# Patient Record
Sex: FEMALE | Race: WHITE | NOT HISPANIC OR LATINO | Employment: FULL TIME | URBAN - METROPOLITAN AREA
[De-identification: names, ages, dates, MRNs, and addresses within clinical notes are randomized per-mention and may not be internally consistent; named-entity substitution may affect disease eponyms.]

---

## 2019-12-06 ENCOUNTER — HOSPITAL ENCOUNTER (OUTPATIENT)
Dept: URGENT CARE | Facility: CLINIC | Age: 34
Discharge: HOME | End: 2019-12-06

## 2020-07-30 ENCOUNTER — HOSPITAL ENCOUNTER (OUTPATIENT)
Dept: URGENT CARE | Facility: CLINIC | Age: 35
Discharge: HOME | End: 2020-07-30
Attending: FAMILY MEDICINE

## 2020-08-10 ENCOUNTER — OFFICE VISIT CONVERTED (OUTPATIENT)
Dept: FAMILY MEDICINE CLINIC | Facility: CLINIC | Age: 35
End: 2020-08-10
Attending: NURSE PRACTITIONER

## 2020-08-28 ENCOUNTER — OFFICE VISIT CONVERTED (OUTPATIENT)
Dept: FAMILY MEDICINE CLINIC | Facility: CLINIC | Age: 35
End: 2020-08-28
Attending: NURSE PRACTITIONER

## 2020-08-28 ENCOUNTER — HOSPITAL ENCOUNTER (OUTPATIENT)
Dept: FAMILY MEDICINE CLINIC | Facility: CLINIC | Age: 35
Discharge: HOME | End: 2020-08-28
Attending: NURSE PRACTITIONER

## 2020-09-03 LAB
CONV LAST MENSTURAL PERIOD: NORMAL
HPV HYBRID CAPTURE HIGH RISK: NEGATIVE
SPECIMEN SOURCE: NORMAL
SPECIMEN SOURCE: NORMAL
THIN PREP CVX: NORMAL

## 2020-10-02 ENCOUNTER — OFFICE VISIT CONVERTED (OUTPATIENT)
Dept: FAMILY MEDICINE CLINIC | Facility: CLINIC | Age: 35
End: 2020-10-02
Attending: NURSE PRACTITIONER

## 2020-10-07 ENCOUNTER — OFFICE VISIT CONVERTED (OUTPATIENT)
Dept: GASTROENTEROLOGY | Facility: CLINIC | Age: 35
End: 2020-10-07
Attending: NURSE PRACTITIONER

## 2020-10-16 ENCOUNTER — TELEMEDICINE CONVERTED (OUTPATIENT)
Dept: FAMILY MEDICINE CLINIC | Facility: CLINIC | Age: 35
End: 2020-10-16
Attending: NURSE PRACTITIONER

## 2021-05-10 NOTE — H&P
"   History and Physical      Patient Name: Jessica Nuñez   Patient ID: [id number]   Sex: Female   YOB: 1985    Primary Care Provider: Stephany SKY   Referring Provider: Stephany SKY    Visit Date: October 7, 2020    Provider: ASYA Metzger   Location: [facility]   Location Address: [address] [place] [address], [facility] [address]  [place], [place]  [id number]   Location Phone: [phone]          Chief Complaint  · ER Follow Up      History Of Present Illness  The patient is a 35 year old /White female who presents on referral from Stephany SKY for a gastroenterology evaluation.      Patient recently visited [facility] ER on 9/15/2020 with a chief complaint of acid reflux. She was d/c and prescribed famotidine 40mg.     She had been having chest/epigastric pain on and off since July. The pain is so severe at times that she felt she was having a heart attack. Describes the pain to feel like \"an air bubble\" stuck in her chest that she cant get up. Admits to reflux that is mildly relieved with 40mg of Pepcid however \"wears off\" early. Stopped drinking coffee as it makes symptoms worse. Denies any dysphagia, nausea, or vomiting. Appetite and weight stable.      Admits to previously taking NSAID's frequently for headaches, tooth pain, etc. She stopped taking these about a month ago after she thought she might have an ulcer.     CBC 9/15/2020: WBC 8.08, hemoglobin 14.9, hematocrit 43.8, platelets 230.  CMP 9/15/2020: GFR greater than 60, alkaline phosphatase 92, ALT 19, AST 21, total bilirubin 0.29  Chest x-ray 9/15/2020: No acute infiltrate is appreciated.       Past Medical History  Allergic rhinitis due to allergen; Anxiety; Depression; Generalized anxiety disorder; GERD (gastroesophageal reflux disease); History of HPV infection; Night sweat; Sexually transmitted disease; Skin Disease         Past Surgical History  Tubal " "ligation         Medication List  cetirizine 10 mg oral tablet; famotidine 20 mg oral tablet; hydroxyzine HCl 50 mg oral tablet         Allergy List  NO KNOWN DRUG ALLERGIES; buspirone       Allergies Reconciled  Family Medical History  Brain Neoplasm, Malignant; Heart Disease; Breast Neoplasm; Lung cancer; Melanoma; Diabetes         Social History  Alcohol (Never); Tobacco (Current every day)         Review of Systems  · Constitutional  o Denies  o : chills, fever  · Eyes  o Denies  o : blurred vision, changes in vision  · Cardiovascular  o Admits  o : chest pain  o Denies  o : irregular heart beats, syncope, dyspnea on exertion, orthopnea, lightheadedness, shortness of breath  · Respiratory  o Denies  o : shortness of breath, dry cough  · Gastrointestinal  o Admits  o : See HPI  · Genitourinary  o Denies  o : dysuria, blood in urine  · Integument  o Denies  o : rash, new skin lesions  · Neurologic  o Denies  o : altered mental status, tingling or numbness  · Musculoskeletal  o Denies  o : joint pain, limitation of motion  · Endocrine  o Denies  o : weight gain, weight loss  · Psychiatric  o Denies  o : anxiety, depression      Vitals  Date Time BP Position Site L\R Cuff Size HR RR TEMP (F) WT  HT  BMI kg/m2 BSA m2 O2 Sat FR L/min FiO2 HC       10/07/2020 08:49 /69 Sitting    83 - R   222lbs 8oz 5'  5\" 37.03 2.15             Physical Examination  · Constitutional  o Appearance  o : well developed, well-nourished, in no acute distress  · Eyes  o Vision  o :   § Visual Fields  § : eyes move symmetrical in all directions  o Sclerae  o : anicteric  o Pupils and Irises  o : pupils equal and symmetrical  · Neck  o Inspection/Palpation  o : supple  · Respiratory  o Respiratory Effort  o : breathing unlabored  o Inspection of Chest  o : normal appearance, no retractions  o Auscultation of Lungs  o : clear to auscultation bilaterally  · Cardiovascular  o Heart  o :   § Auscultation of Heart  § : no murmurs, gallops or " rubs  · Gastrointestinal  o Abdominal Examination  o : epigastric region tenderness to palpation present, normal bowel sounds, tone normal without rigidity or guarding, no masses present, abdomen scaphoid upon supine  o Digital Rectal Exam  o : deferred  · Lymphatic  o Neck  o : no palpable lymphadenopathy  · Skin and Subcutaneous Tissue  o General Inspection  o : without focal lesions; turgor is normal  · Psychiatric  o General  o : Alert and oriented x3  o Mood and Affect  o : Mood and affect are appropriate to circumstances          Assessment  · Epigastric pain     789.06/R10.13  · GERD (gastroesophageal reflux disease)     530.81/K21.9  · Chest pain     786.50/R07.9      Plan  · Orders  o [facility] Pre-Op Covid-19 Screening (37389) - - 10/07/2020  o Consent for Esophagogastroduodenoscopy (EGD) - Possible risks/complications, benefits, and alternatives to surgical or invasive procedure have been explained to patient and/or legal guardian. - Patient has been evaluated and can tolerate anesthesia and/or sedation. Risks, benefits, and alternatives to anesthesia and sedation have been explained to patient and/or legal guardian. (75648) - - 10/07/2020  · Medications  o Protonix 20 mg oral tablet,delayed release (DR/EC)   SIG: take 1 tablet (20 mg) by oral route once daily for 30 days   DISP: (30) Tablet with 3 refills  Prescribed on 10/07/2020     o famotidine 40 mg oral tablet   SIG: take 1 tablet (40 mg) by oral route once daily at bedtime for 30 days   DISP: (30) Tablet with 3 refills  Prescribed on 10/07/2020     o famotidine 20 mg oral tablet   SIG: take 1 tablet (20 mg) by oral route once daily   DISP: (30) tablets with 5 refills  Discontinued on 10/07/2020     o Medications have been Reconciled  · Instructions  o PLAN: Proceed with procedure. Patient understands risks and benefits and is willing to proceed. Understands the risks include, but are not limited to, bleeding and/or perforation.  o Information given on  current diagnoses.  o Lifestyle modifications discussed.  o Electronically Identified Patient Education Materials Provided Electronically  · Disposition  o Follow up after procedure            Electronically Signed by: ASYA Metzger -Author on October 7, 2020 09:13:56 AM
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
## 2021-05-13 NOTE — PROGRESS NOTES
Progress Note      Patient Name: Jessica Nuñez   Patient ID: [id number]   Sex: Female   YOB: 1985    Primary Care Provider: Page SKY    Visit Date: August 10, 2020    Provider: ASYA Logan   Location: [facility]   Location Address: [address] [place] [address], [address]  [id number]   Location Phone: [phone]          Chief Complaint  · follow up from ER  · establish care      History Of Present Illness  Jessica Nuñez is a 35 year old /White female who presents for evaluation and treatment of:      For follow-up from the emergency room on 8/4/2020 and to establish care.  She is a previous patient of ASYA Brandon.    She states she went to the emergency room because she was having heart palpitations.  She had some work-up done and was told that she has anxiety.  She was started on hydroxyzine 50 mg 4 times per day as needed.  She states she has been taking hydroxyzine twice daily and has had good results.  Her PHQ 9 score was elevated today at 14 but she denies feeling depressed.  She states she did have depression at one time and she actually had a suicide attempt in the past.  She denies any suicidal or homicidal ideation now.  She states she was on Zoloft in the past which did not help.  She states that the hydroxyzine is controlling her anxiety now and she feels that she does not need any other medication at this time.    She was also having some acid reflux and was started on Pepcid 10 mg, she states this was not helping so she has been taking 2 pills which is controlling her acid reflux.    She is a current smoker, states she has decreased from 2 packs/day down to 1 pack/day.    She states it has been over 10 years since she has had a Pap smear.  She states she had positive HPV.       Past Medical History  Disease Name Date Onset Notes   Anxiety --  --    Depression --  --    Generalized anxiety disorder 08/10/2020 --    GERD  (gastroesophageal reflux disease) 08/10/2020 --    History of HPV infection 08/10/2020 --    Night sweat --  --    Sexually transmitted disease --  --    Skin Disease --  --          Past Surgical History  Procedure Name Date Notes   Tubal ligation 2009 --          Medication List  Name Date Started Instructions   famotidine 20 mg oral tablet 08/10/2020 take 1 tablet (20 mg) by oral route once daily   hydroxyzine HCl 50 mg oral tablet 08/10/2020 take 1 tablet (50 mg) by oral route 4 times per day as needed for anxiety         Allergy List  Allergen Name Date Reaction Notes   NO KNOWN DRUG ALLERGIES --  --  --          Family Medical History  Disease Name Relative/Age Notes   Brain Neoplasm, Malignant  great aunt maternal   Heart Disease Grandfather (maternal)/  Grandfather (paternal)/  Grandmother (maternal)/   --    Breast Neoplasm  great gma maternal   Lung cancer  great gma maternal great gpa maternal   Melanoma Grandfather (maternal)/   --    Diabetes Grandfather (maternal)/   --          Social History  Finding Status Start/Stop Quantity Notes   Alcohol Never --/-- --  --    Tobacco Current every day 14/-- 1 ppd --          Review of Systems  · Constitutional  o Admits  o : fatigue  o Denies  o : fever, weight loss, weight gain  · HENT  o Denies  o : sore throat, ear pain  · Cardiovascular  o Denies  o : lower extremity edema, claudication, chest pressure, palpitations  · Respiratory  o Denies  o : shortness of breath, wheezing, cough, hemoptysis, dyspnea on exertion  · Gastrointestinal  o Admits  o : heartburn, reflux  o Denies  o : nausea, vomiting, diarrhea, constipation, abdominal pain  · Genitourinary  o Denies  o : urgency, frequency  · Psychiatric  o Admits  o : anxiety, difficulty sleeping  o Denies  o : depression, suicidal ideation, homicidal ideation      Vitals  Date Time BP Position Site L\R Cuff Size HR RR TEMP (F) WT  HT  BMI kg/m2 BSA m2 O2 Sat HC       08/10/2020 08:54 /72 Sitting    85 -  "R  97.7 218lbs 2oz 5'  5\" 36.3 2.13 96 %          Physical Examination  · Constitutional  o Appearance  o : no acute distress, well-nourished  · Head and Face  o Head  o :   § Inspection  § : atraumatic, normocephalic  · Neck  o Thyroid  o : gland size normal, nontender, no nodules or masses present on palpation, symmetric  · Respiratory  o Respiratory Effort  o : breathing comfortably, symmetric chest rise  o Auscultation of Lungs  o : clear to asculatation bilaterally, no wheezes, rales, or rhonchii  · Cardiovascular  o Heart  o :   § Auscultation of Heart  § : regular rate and rhythm, no murmurs, rubs, or gallops  o Peripheral Vascular System  o :   § Extremities  § : no edema  · Lymphatic  o Neck  o : no lymphadenopathy present  · Neurologic  o Mental Status Examination  o :   § Orientation  § : grossly oriented to person, place and time  o Gait and Station  o :   § Gait Screening  § : normal gait  · Psychiatric  o General  o : normal mood and affect  o Presence of Abnormal Thoughts  o : no hallucinations, no delusions present, no psychotic thoughts, no homicidal ideation, no suicidal ideation, no evidence of obsessional thinking          Assessment  · Generalized anxiety disorder     300.02/F41.1  · GERD (gastroesophageal reflux disease)     530.81/K21.9  · Cigarette nicotine dependence without complication     305.1/F17.210  · History of HPV infection     V12.09/Z86.19    Problems Reconciled  Plan  · Orders  o ACO-39: Current medications updated and reviewed () - - 08/10/2020  o ACO-18: Positive screen for clinical depression using a standardized tool and a follow-up plan documented () - 300.02/F41.1 - 08/10/2020   14 pts.   Patient denies depression but does have anxiety  · Medications  o famotidine 20 mg oral tablet   SIG: take 1 tablet (20 mg) by oral route once daily   DISP: (30) tablets with 5 refills  Adjusted on 08/10/2020     o hydroxyzine HCl 50 mg oral tablet   SIG: take 1 tablet (50 mg) by " oral route 4 times per day as needed for anxiety   DISP: (120) tablet with 3 refills  Adjusted on 08/10/2020     o Medications have been Reconciled  o Transition of Care or Provider Policy  · Instructions  o *Form of nicotine being used: Cigarettes  o Patient was strongly encouraged to discontinue use of any nicotine containing product or minimize the use of the product.  o Patient was educated/instructed on their diagnosis, treatment and medications prior to discharge from the clinic today.  o Patient counseled to stop smoking.  o Patient instructed to seek medical attention urgently for new or worsening symptoms.  o Call the office with any concerns or questions.  o Discussed Covid-19 precautions including, but not limited to, social distancing, avoid touching your face, and hand washing.   · Disposition  o Call or Return if symptoms worsen or persist.     Anxiety is controlled with hydroxyzine, patient will continue this at this time.    I discussed with patient the importance of getting Pap smear done especially with a history of HPV.  Patient will schedule Pap smear in the next few weeks.  We will do routine follow-up in 6 months.             Electronically Signed by: ASYA Logan -Author on August 10, 2020 10:53:35 AM

## 2021-05-13 NOTE — PROGRESS NOTES
Progress Note      Patient Name: Jessica Nuñez   Patient ID: [id number]   Sex: Female   YOB: 1985    Primary Care Provider: Stephany SKY   Referring Provider: Stephany SKY    Visit Date: October 2, 2020    Provider: ASYA Logan   [place] [facility] [place]   [place] Address: [address] [place] [address], [address] [place] [address]  [id number]   Location Phone: [phone]          Chief Complaint  · throat swelling from buspirone      History Of Present Illness  Jessica Nuñez is a 35 year old /White female who presents for evaluation and treatment of:      She is here for follow-up on anxiety.  She had been taking hydroxyzine 50 mg as needed for anxiety but she had had some panic attacks that the hydroxyzine did not work.  I had called her in some buspirone on 9/15/2020 but she complained of her throat swelling after taking it.  She was not sure if that was the cause but when she took it again she had the same problem.  She also complained of some blurred vision after taking the buspirone.  She had taken Zoloft in the past which did not seem to help.  She denies any suicidal or homicidal ideation.  We discussed gene site testing today and discussed maybe starting her on a benzodiazepine temporarily.  She states past history of using marijuana but states she quit over 3 months ago because it was making her anxiety worse.    She is also complaining of some allergy symptoms, postnasal drainage.  She has taken Claritin in the past that did not help.       Past Medical History  Disease Name Date Onset Notes   Anxiety --  --    Depression --  --    Generalized anxiety disorder 08/10/2020 --    GERD (gastroesophageal reflux disease) 08/10/2020 --    History of HPV infection 08/10/2020 --    Night sweat --  --    Sexually transmitted disease --  --    Skin Disease --  --          Past Surgical History  Procedure Name Date Notes   Tubal ligation 2009 --   "        Medication List  Name Date Started Instructions   famotidine 20 mg oral tablet 08/10/2020 take 1 tablet (20 mg) by oral route once daily   hydroxyzine HCl 50 mg oral tablet 08/10/2020 take 1 tablet (50 mg) by oral route 4 times per day as needed for anxiety         Allergy List  Allergen Name Date Reaction Notes   NO KNOWN DRUG ALLERGIES --  --  --    buspirone Oct 2 2020 12:00AM throat swelling --        Allergies Reconciled  Family Medical History  Disease Name Relative/Age Notes   Brain Neoplasm, Malignant  great aunt maternal   Heart Disease Grandfather (maternal)/  Grandfather (paternal)/  Grandmother (maternal)/   --    Breast Neoplasm  great gma maternal   Lung cancer  great gma maternal great gpa maternal   Melanoma Grandfather (maternal)/   --    Diabetes Grandfather (maternal)/   --          Social History  Finding Status Start/Stop Quantity Notes   Alcohol Never --/-- --  --    Tobacco Current every day 14/-- 1 ppd --          Review of Systems  · Constitutional  o Denies  o : fever, fatigue, weight loss, weight gain  · HENT  o Admits  o : postnasal drip  o Denies  o : sinus pain, sore throat, ear pain  · Cardiovascular  o Denies  o : lower extremity edema, claudication, chest pressure, palpitations  · Respiratory  o Denies  o : shortness of breath, wheezing, cough, hemoptysis, dyspnea on exertion  · Gastrointestinal  o Denies  o : nausea, vomiting, diarrhea, constipation, abdominal pain  · Integument  o Denies  o : rash, itching  · Psychiatric  o Admits  o : anxiety  o Denies  o : depression, suicidal ideation, homicidal ideation      Vitals  Date Time BP Position Site L\R Cuff Size HR RR TEMP (F) WT  HT  BMI kg/m2 BSA m2 O2 Sat FR L/min FiO2 HC       10/02/2020 08:16 /68 Sitting    85 - R  98.8 230lbs 0oz 5'  5\" 38.27 2.19 91 %            Physical Examination  · Constitutional  o Appearance  o : no acute distress, well-nourished  · Head and Face  o Head  o :   § Inspection  § : atraumatic, " normocephalic  · Ears, Nose, Mouth and Throat  o Ears  o :   § External Ears  § : normal  § Otoscopic Examination  § : tympanic membrane dull in appearance-bilaterally   o Nose  o :   § Intranasal Exam  § : nares patent  o Oral Cavity  o :   § Oral Mucosa  § : moist mucous membranes  o Throat  o :   § Oropharynx  § : no inflammation or lesions present, tonsils within normal limits  · Neck  o Thyroid  o : gland size normal, nontender, no nodules or masses present on palpation, symmetric  · Respiratory  o Respiratory Effort  o : breathing comfortably, symmetric chest rise  o Auscultation of Lungs  o : clear to asculatation bilaterally, no wheezes, rales, or rhonchii  · Cardiovascular  o Heart  o :   § Auscultation of Heart  § : regular rate and rhythm, no murmurs, rubs, or gallops  o Peripheral Vascular System  o :   § Extremities  § : no edema  · Lymphatic  o Neck  o : no lymphadenopathy present  · Neurologic  o Mental Status Examination  o :   § Orientation  § : grossly oriented to person, place and time  o Gait and Station  o :   § Gait Screening  § : normal gait  · Psychiatric  o General  o : normal mood and affect  o Presence of Abnormal Thoughts  o : no hallucinations, no delusions present, no psychotic thoughts, no homicidal ideation, no suicidal ideation, no evidence of obsessional thinking          Results  · In-Office Procedures  o Lab procedure  § IOP - Urine Drug Screen In-House [facility] ([address])   § Amphetamines Ur Ql: Negative   § Barbiturates Ur Ql: Negative   § Buprenorphine+Nor Ur Ql Scn: Negative   § Benzodiaz Ur Ql: Negative   § Cocaine Ur Ql: Negative   § Methadone Ur Ql: Negative   § Methamphet Ur Ql: Negative   § MDMA Ur Ql Scn: Negative   § Opiates Ur Ql: Negative   § Oxycodone Ur Ql: Negative   § PCP Ur Ql: Negative   § THC Ur Ql: Positive   § Temp in Range?: Within/Acceptable   § Control Seen?: Yes       Assessment  · Allergic rhinitis due to allergen     477.9/J30.9  · Generalized anxiety  disorder     300.02/F41.1      Plan  · Orders  o ACO-39: Current medications updated and reviewed (1159F, ) - - 10/02/2020  o [facility] (Send out) (GENES) - 300.02/F41.1 - 10/02/2020  · Medications  o cetirizine 10 mg oral tablet   SIG: take 1 tablet (10 mg) by oral route once daily in evening for allergies   DISP: (30) Tablet with 5 refills  Prescribed on 10/02/2020     o Medications have been Reconciled  o Transition of Care or Provider Policy  · Instructions  o Patient was educated/instructed on their diagnosis, treatment and medications prior to discharge from the clinic today.  o Patient instructed to seek medical attention urgently for new or worsening symptoms.  o Call the office with any concerns or questions.  · Disposition  o Return to clinic in 2 weeks     UDS positive for THC today.  Will not prescribe her controlled medicine at this time.    We will collect genesite test today and patient will follow-up in 2 weeks to discuss results and treatment plan.               Electronically Signed by: ASYA Logan -Author on October 2, 2020 12:58:34 PM

## 2021-05-13 NOTE — PROGRESS NOTES
Progress Note      Patient Name: Jessica Nuñez   Patient ID: [id number]   Sex: Female   YOB: 1985    Primary Care Provider: Stephany SKY   Referring Provider: Stephany SKY    Visit Date: August 28, 2020    Provider: ASYA Logan   Location: [facility]   Location Address: [address] [place] [address], [address]  [id number]   Location Phone: [phone]          Chief Complaint  · Annual Exam  · PAP exam  · (Health Maintainence Information Reviewed Under Results)      History Of Present Illness  Last PAP Smear: 2010.   Date of Last Mammogram: N/A.   Date of Last Colonoscopy: N/A   No current complaints.   Jessica Nuñez is a 35 year old /White female who presents for evaluation and treatment of:      She is here for annual physical and routine Pap smear.  She is does have a history of HPV.    Her last menstrual period was last week.  She states her menstrual cycles are regular.      She denies any need for STD testing.  She denies any vaginal discharge.    She states she does have chafing in her groin/external vaginal area due to wearing jeans and she does not wear underwear there.  She states she usually uses some diaper rash cream which controls it.    History of anxiety: She states she has been taking hydroxyzine only as needed now and states that it has helped with her anxiety.       Past Medical History  Disease Name Date Onset Notes   Anxiety --  --    Depression --  --    Generalized anxiety disorder 08/10/2020 --    GERD (gastroesophageal reflux disease) 08/10/2020 --    History of HPV infection 08/10/2020 --    Night sweat --  --    Sexually transmitted disease --  --    Skin Disease --  --          Past Surgical History  Procedure Name Date Notes   Tubal ligation 2009 --          Medication List  Name Date Started Instructions   famotidine 20 mg oral tablet 08/10/2020 take 1 tablet (20 mg) by oral route once daily   hydroxyzine HCl 50  mg oral tablet 08/10/2020 take 1 tablet (50 mg) by oral route 4 times per day as needed for anxiety         Allergy List  Allergen Name Date Reaction Notes   NO KNOWN DRUG ALLERGIES --  --  --          Family Medical History  Disease Name Relative/Age Notes   Brain Neoplasm, Malignant  great aunt maternal   Heart Disease Grandfather (maternal)/  Grandfather (paternal)/  Grandmother (maternal)/   --    Breast Neoplasm  great gma maternal   Lung cancer  great gma maternal great gpa maternal   Melanoma Grandfather (maternal)/   --    Diabetes Grandfather (maternal)/   --          Social History  Finding Status Start/Stop Quantity Notes   Alcohol Never --/-- --  --    Tobacco Current every day 14/-- 1 ppd --          Review of Systems  · Constitutional  o Denies  o : appetite change, fatigue, night sweats, weight gain, weight loss  · HENT  o Denies  o : hearing loss, tinnitus, vertigo, nasal discharge, nose bleeding, dental problems, oral lesions, sore throat  · Breasts  o Denies  o : lumps, tenderness, swelling, nipple discharge  · Cardiovascular  o Denies  o : chest pain, decreased exercise tolerance, dyspnea on exertion, palpitations  · Respiratory  o Denies  o : cough, shortness of breath, wheezing, snoring, apneas  · Gastrointestinal  o Denies  o : abdominal pain, nausea, vomiting, dysphagia, heartburn, changes in bowel habits, constipation, diarrhea  · Genitourinary  o Denies  o : dysuria, hematuria, incontinence, nocturia, frequency, urgency, sexual dysfunction  · Integument  o Admits  o : rash  o Denies  o : itching  · Neurologic  o Denies  o : abnormal gait, facial weakness, headache, memory difficulties, tingling or numbness, seizures, tremors  · Psychiatric  o Admits  o : anxiety, panic attacks  o Denies  o : decreased concentration, irritability, sleep distrubances, sadness/tearfulness, depression      Vitals  Date Time BP Position Site L\R Cuff Size HR RR TEMP (F) WT  HT  BMI kg/m2 BSA m2 O2 Sat HC      "  08/28/2020 02:47 /82 Sitting    80 - R  98.2 217lbs 0oz 5'  5\" 36.11 2.12 97 %          Physical Examination  · Constitutional  o Appearance  o : well-nourished, in no acute distress  · Neck  o Inspection/Palpation  o : normal appearance, no masses or tenderness, trachea midline  o Thyroid  o : gland size normal, nontender, no nodules or masses present on palpation  · Respiratory  o Respiratory Effort  o : breathing unlabored  o Inspection of Chest  o : normal appearance  o Auscultation of Lungs  o : normal breath sounds throughout  · Cardiovascular  o Heart  o :   § Auscultation of Heart  § : regular rate and rhythm, no murmurs, gallops or rubs  · Breasts  o Inspection of Breasts  o : breasts symmetrical, no skin changes, no deformities present, no discharge present  o Palpation of Breasts, Axillae  o : no masses present on palpation, no breast tenderness  · Gastrointestinal  o Abdominal Examination  o : abdomen nontender to palpation, tone normal without rigidity or guarding, no masses present, normal bowel sounds  · Genitourinary  o External Genitalia  o : Mild erythremia noted, no inflammation, no lesions present, no masses present  o Vagina  o : normal vaginal vault, no discharge present, no inflammatory lesions present, no masses present  o Bladder  o : nontender to palpation  o Cervix  o : appearance healthy, no lesions present, nontender to palpation, no discharges, no bleeding present, normal midline position  o Uterus  o : nontender to palpation, no masses present, position midline/midplane  o Adnexa  o : no tenderness or masses present on bimanual examination  o Perineum  o : perineum within normal limits  · Lymphatic  o Neck  o : no lymphadenopathy present  o Axilla  o : no lymphadenopathy present  · Neurologic  o Mental Status Examination  o :   § Orientation  § : grossly oriented to person, place and time  o Gait and Station  o : normal gait, able to stand without " difficulty  · Psychiatric  o Judgement and Insight  o : judgment and insight intact  o Mood and Affect  o : mood normal, affect appropriate          Assessment  · Routine gynecological examination     V72.31/Z01.419  · Annual physical exam     V70.0/Z00.00  · Pap Smear     V76.2/Z01.419  · Generalized anxiety disorder     300.02/F41.1  · History of HPV infection     V12.09/Z86.19    Problems Reconciled  Plan  · Orders  o Pap smear (36446) - V76.2/Z01.419 - 08/28/2020  o ACO-39: Current medications updated and reviewed () - - 08/28/2020  · Medications  o Medications have been Reconciled  o Transition of Care or Provider Policy  · Instructions  o Reviewed health maintenance flowsheet and updated information. Orders were placed and/or patient's response was documented.  o **Pap Test/Liquid Based:   o Thin Prep  o Source:   o Cervix  o ********  o **Perform a routine Human Papilloma Virus (HPV) High Risk on this Pap   o ********  o Medicare:  o No  o **Is this an annual PAP:  o Yes  o **Clinical History  o Last Menstrual Period (First Day of): 08/15/2020  o Contraceptive: tubal ligation  o Counseled on monthly breast self exams.   o Counseled on STD prevention.  o Used cytobrush to obtain Pap smear specimen. Sent to pathology for testing and review.  o Patient is taking medications as prescribed and doing well.   o Patient was educated/instructed on their diagnosis, treatment and medications prior to discharge from the clinic today.  o Patient instructed to seek medical attention urgently for new or worsening symptoms.  o Call the office with any concerns or questions.  · Disposition  o Return to clinic in 6 months            Electronically Signed by: Stephany Ledesma APRN -Author on August 28, 2020 03:26:09 PM

## 2021-05-14 VITALS
DIASTOLIC BLOOD PRESSURE: 68 MMHG | OXYGEN SATURATION: 91 % | SYSTOLIC BLOOD PRESSURE: 112 MMHG | HEART RATE: 85 BPM | TEMPERATURE: 98.8 F | WEIGHT: 230 LBS | BODY MASS INDEX: 38.32 KG/M2 | HEIGHT: 65 IN

## 2021-05-14 VITALS
TEMPERATURE: 98.2 F | HEIGHT: 65 IN | SYSTOLIC BLOOD PRESSURE: 118 MMHG | WEIGHT: 217 LBS | OXYGEN SATURATION: 97 % | DIASTOLIC BLOOD PRESSURE: 82 MMHG | HEART RATE: 80 BPM | BODY MASS INDEX: 36.15 KG/M2

## 2021-05-14 VITALS
HEART RATE: 83 BPM | BODY MASS INDEX: 37.07 KG/M2 | SYSTOLIC BLOOD PRESSURE: 121 MMHG | WEIGHT: 222.5 LBS | DIASTOLIC BLOOD PRESSURE: 69 MMHG | HEIGHT: 65 IN

## 2021-05-15 VITALS
HEART RATE: 85 BPM | OXYGEN SATURATION: 96 % | BODY MASS INDEX: 36.34 KG/M2 | TEMPERATURE: 97.7 F | WEIGHT: 218.12 LBS | DIASTOLIC BLOOD PRESSURE: 72 MMHG | HEIGHT: 65 IN | SYSTOLIC BLOOD PRESSURE: 122 MMHG

## 2021-06-23 ENCOUNTER — TELEMEDICINE (OUTPATIENT)
Dept: FAMILY MEDICINE CLINIC | Facility: CLINIC | Age: 36
End: 2021-06-23

## 2021-06-23 DIAGNOSIS — K21.00 GASTROESOPHAGEAL REFLUX DISEASE WITH ESOPHAGITIS WITHOUT HEMORRHAGE: ICD-10-CM

## 2021-06-23 DIAGNOSIS — F41.1 GENERALIZED ANXIETY DISORDER: Primary | ICD-10-CM

## 2021-06-23 PROBLEM — K21.9 GERD (GASTROESOPHAGEAL REFLUX DISEASE): Status: ACTIVE | Noted: 2020-08-10

## 2021-06-23 PROBLEM — F32.A DEPRESSION: Status: ACTIVE | Noted: 2021-06-23

## 2021-06-23 PROBLEM — Z86.19 HISTORY OF HPV INFECTION: Status: ACTIVE | Noted: 2020-08-10

## 2021-06-23 PROCEDURE — 99213 OFFICE O/P EST LOW 20 MIN: CPT | Performed by: NURSE PRACTITIONER

## 2021-06-23 RX ORDER — FAMOTIDINE 20 MG/1
20 TABLET, FILM COATED ORAL 2 TIMES DAILY
Qty: 60 TABLET | Refills: 5 | Status: SHIPPED | OUTPATIENT
Start: 2021-06-23

## 2021-06-23 NOTE — PROGRESS NOTES
Chief Complaint  Depression and Heartburn    Subjective          Jessica Stephanie Nuñez presents to [facility]  This was an audio and video enabled telemedicine encounter.You have chosen to receive care through a telehealth visit.  Do you consent to use a video/audio connection for your medical care today? Yes    She presents for follow-up and med refills.    History of generalized anxiety: She states she is doing well on hydroxyzine 50 mg 4 times a day as needed.  She states she never did start Wellbutrin because she was scared to take it.  She states she is doing fine on just hydroxyzine so she does not want to take the Wellbutrin.    She states she is currently staying in [place] to take care of a family member.  She states that she has removed some stressors in her life.  She is reconnected with an old boyfriend and is planning to get  next month.    GERD: She states she is no longer taking pantoprazole, states she took it for 2 months and she did not feel like it helped.  She is taking famotidine 10 mg but states she is taking at least 3/day.         Objective   Vital Signs:   There were no vitals taken for this visit.    Physical Exam  Constitutional:       Appearance: Normal appearance.   HENT:      Head: Normocephalic.   Pulmonary:      Effort: Pulmonary effort is normal.   Neurological:      Mental Status: She is alert.   Psychiatric:         Mood and Affect: Mood normal.         Behavior: Behavior normal.         Thought Content: Thought content normal.         Judgment: Judgment normal.        Result Review :                 Assessment and Plan    Diagnoses and all orders for this visit:    1. Generalized anxiety disorder (Primary)  Comments:  Stable on hydroxyzine.    2. Gastroesophageal reflux disease with esophagitis without hemorrhage  Comments:  I will increase her famotidine dose to 20 mg and she can take it twice daily.  We also discussed preventative  lifestyle changes.    Other orders  -     famotidine (PEPCID) 20 MG tablet; Take 1 tablet by mouth 2 (Two) Times a Day.  Dispense: 60 tablet; Refill: 5        Follow Up   Return in about 6 months (around 12/23/2021) for Next scheduled follow up.  Patient was given instructions and counseling regarding her condition or for health maintenance advice. Please see specific information pulled into the AVS if appropriate.

## 2021-06-23 NOTE — PATIENT INSTRUCTIONS
Gastroesophageal Reflux Disease, Adult  Gastroesophageal reflux (DANILO) happens when acid from the stomach flows up into the tube that connects the mouth and the stomach (esophagus). Normally, food travels down the esophagus and stays in the stomach to be digested. However, when a person has DANILO, food and stomach acid sometimes move back up into the esophagus. If this becomes a more serious problem, the person may be diagnosed with a disease called gastroesophageal reflux disease (GERD). GERD occurs when the reflux:  · Happens often.  · Causes frequent or severe symptoms.  · Causes problems such as damage to the esophagus.  When stomach acid comes in contact with the esophagus, the acid may cause soreness (inflammation) in the esophagus. Over time, GERD may create small holes (ulcers) in the lining of the esophagus.  What are the causes?  This condition is caused by a problem with the muscle between the esophagus and the stomach (lower esophageal sphincter, or LES). Normally, the LES muscle closes after food passes through the esophagus to the stomach. When the LES is weakened or abnormal, it does not close properly, and that allows food and stomach acid to go back up into the esophagus.  The LES can be weakened by certain dietary substances, medicines, and medical conditions, including:  · Tobacco use.  · Pregnancy.  · Having a hiatal hernia.  · Alcohol use.  · Certain foods and beverages, such as coffee, chocolate, onions, and peppermint.  What increases the risk?  You are more likely to develop this condition if you:  · Have an increased body weight.  · Have a connective tissue disorder.  · Use NSAID medicines.  What are the signs or symptoms?  Symptoms of this condition include:  · Heartburn.  · Difficult or painful swallowing.  · The feeling of having a lump in the throat.  · A bitter taste in the mouth.  · Bad breath.  · Having a large amount of saliva.  · Having an upset or bloated  stomach.  · Belching.  · Chest pain. Different conditions can cause chest pain. Make sure you see your health care provider if you experience chest pain.  · Shortness of breath or wheezing.  · Ongoing (chronic) cough or a night-time cough.  · Wearing away of tooth enamel.  · Weight loss.  How is this diagnosed?  Your health care provider will take a medical history and perform a physical exam. To determine if you have mild or severe GERD, your health care provider may also monitor how you respond to treatment. You may also have tests, including:  · A test to examine your stomach and esophagus with a small camera (endoscopy).  · A test that measures the acidity level in your esophagus.  · A test that measures how much pressure is on your esophagus.  · A barium swallow or modified barium swallow test to show the shape, size, and functioning of your esophagus.  How is this treated?  The goal of treatment is to help relieve your symptoms and to prevent complications. Treatment for this condition may vary depending on how severe your symptoms are. Your health care provider may recommend:  · Changes to your diet.  · Medicine.  · Surgery.  Follow these instructions at home:  Eating and drinking    · Follow a diet as recommended by your health care provider. This may involve avoiding foods and drinks such as:  ? Coffee and tea (with or without caffeine).  ? Drinks that contain alcohol.  ? Energy drinks and sports drinks.  ? Carbonated drinks or sodas.  ? Chocolate and cocoa.  ? Peppermint and mint flavorings.  ? Garlic and onions.  ? Horseradish.  ? Spicy and acidic foods, including peppers, chili powder, joseph powder, vinegar, hot sauces, and barbecue sauce.  ? Citrus fruit juices and citrus fruits, such as oranges, viraj, and limes.  ? Tomato-based foods, such as red sauce, chili, salsa, and pizza with red sauce.  ? Fried and fatty foods, such as donuts, french fries, potato chips, and high-fat dressings.  ? High-fat  meats, such as hot dogs and fatty cuts of red and white meats, such as rib eye steak, sausage, ham, and leiva.  ? High-fat dairy items, such as whole milk, butter, and cream cheese.  · Eat small, frequent meals instead of large meals.  · Avoid drinking large amounts of liquid with your meals.  · Avoid eating meals during the 2-3 hours before bedtime.  · Avoid lying down right after you eat.  · Do not exercise right after you eat.  Lifestyle    · Do not use any products that contain nicotine or tobacco, such as cigarettes, e-cigarettes, and chewing tobacco. If you need help quitting, ask your health care provider.  · Try to reduce your stress by using methods such as yoga or meditation. If you need help reducing stress, ask your health care provider.  · If you are overweight, reduce your weight to an amount that is healthy for you. Ask your health care provider for guidance about a safe weight loss goal.  General instructions  · Pay attention to any changes in your symptoms.  · Take over-the-counter and prescription medicines only as told by your health care provider. Do not take aspirin, ibuprofen, or other NSAIDs unless your health care provider told you to do so.  · Wear loose-fitting clothing. Do not wear anything tight around your waist that causes pressure on your abdomen.  · Raise (elevate) the head of your bed about 6 inches (15 cm).  · Avoid bending over if this makes your symptoms worse.  · Keep all follow-up visits as told by your health care provider. This is important.  Contact a health care provider if:  · You have:  ? New symptoms.  ? Unexplained weight loss.  ? Difficulty swallowing or it hurts to swallow.  ? Wheezing or a persistent cough.  ? A hoarse voice.  · Your symptoms do not improve with treatment.  Get help right away if you:  · Have pain in your arms, neck, jaw, teeth, or back.  · Feel sweaty, dizzy, or light-headed.  · Have chest pain or shortness of breath.  · Vomit and your vomit looks  like blood or coffee grounds.  · Faint.  · Have stool that is bloody or black.  · Cannot swallow, drink, or eat.  Summary  · Gastroesophageal reflux happens when acid from the stomach flows up into the esophagus. GERD is a disease in which the reflux happens often, causes frequent or severe symptoms, or causes problems such as damage to the esophagus.  · Treatment for this condition may vary depending on how severe your symptoms are. Your health care provider may recommend diet and lifestyle changes, medicine, or surgery.  · Contact a health care provider if you have new or worsening symptoms.  · Take over-the-counter and prescription medicines only as told by your health care provider. Do not take aspirin, ibuprofen, or other NSAIDs unless your health care provider told you to do so.  · Keep all follow-up visits as told by your health care provider. This is important.  This information is not intended to replace advice given to you by your health care provider. Make sure you discuss any questions you have with your health care provider.  Document Revised: 06/26/2019 Document Reviewed: 06/26/2019  [employer]vier Patient Education © 2021 [employer]

## 2021-09-03 RX ORDER — HYDROXYZINE 50 MG/1
50 TABLET, FILM COATED ORAL EVERY 6 HOURS PRN
Qty: 120 TABLET | Refills: 2 | OUTPATIENT
Start: 2021-09-03

## 2021-09-20 RX ORDER — HYDROXYZINE 50 MG/1
50 TABLET, FILM COATED ORAL EVERY 6 HOURS PRN
Qty: 120 TABLET | Refills: 2 | Status: SHIPPED | OUTPATIENT
Start: 2021-09-20

## 2022-05-25 ENCOUNTER — TELEPHONE (OUTPATIENT)
Dept: FAMILY MEDICINE CLINIC | Facility: CLINIC | Age: 37
End: 2022-05-25

## 2022-05-25 NOTE — TELEPHONE ENCOUNTER
Caller: Jessica Nuñez    Relationship to patient: Self    Best call back number: [phone]    Patient is needing:PATIENT CALLED IN AND SAID THAT SHE WAS SEEN BY ASYA PHILIPPE AND WAS ORDERED A LAB TEST TO DETERMINE WHAT WOULD BE THE BEST MEDICATION FOR HERSELF TO TAKE AND SHE WILL BE SEEING  [facility] IN [place], [place] AND WOULD LIKE TO HAVE A COPY OF THAT LAB SENT TO HER MY CHART ACCOUNT.  PLEASE CALL PATIENT AND ADVISE. PATIENT THOUGHT TEST WOULD HAVE BEEN RUN IN 2020. PATIENT HAS AN APPOINTMENT WITH [facility] TOMORROW.

## 2022-05-26 NOTE — TELEPHONE ENCOUNTER
It has been a long time since I seen her.  Yes she can get a copy of the gene site for her new provider.  It is in her MyChart if she needs it or she can request hardcopy.

## 2022-06-06 NOTE — TELEPHONE ENCOUNTER
Spoke with patient she is going to try my chart to get copy gene site if not she will send request from new provider for hardcopy.

## 2024-06-21 ENCOUNTER — HOSPITAL ENCOUNTER (EMERGENCY)
Facility: HOSPITAL | Age: 39
Discharge: HOME | End: 2024-06-22
Attending: EMERGENCY MEDICINE
Payer: COMMERCIAL

## 2024-06-21 ENCOUNTER — APPOINTMENT (OUTPATIENT)
Dept: GENERAL RADIOLOGY | Facility: HOSPITAL | Age: 39
End: 2024-06-21
Payer: COMMERCIAL

## 2024-06-21 DIAGNOSIS — R42 VERTIGO: Primary | ICD-10-CM

## 2024-06-21 DIAGNOSIS — T78.40XA ALLERGY, INITIAL ENCOUNTER: ICD-10-CM

## 2024-06-21 DIAGNOSIS — H69.93 DYSFUNCTION OF BOTH EUSTACHIAN TUBES: ICD-10-CM

## 2024-06-21 LAB
ALBUMIN SERPL-MCNC: 4.4 G/DL (ref 3.5–5.2)
ALBUMIN/GLOB SERPL: 1.5 G/DL
ALP SERPL-CCNC: 91 U/L (ref 39–117)
ALT SERPL W P-5'-P-CCNC: 17 U/L (ref 1–33)
ANION GAP SERPL CALCULATED.3IONS-SCNC: 12 MMOL/L (ref 5–15)
AST SERPL-CCNC: 18 U/L (ref 1–32)
BASOPHILS # BLD AUTO: 0.04 10*3/MM3 (ref 0–0.2)
BASOPHILS NFR BLD AUTO: 0.5 % (ref 0–1.5)
BILIRUB SERPL-MCNC: 0.4 MG/DL (ref 0–1.2)
BUN SERPL-MCNC: 8 MG/DL (ref 6–20)
BUN/CREAT SERPL: 9.9 (ref 7–25)
CALCIUM SPEC-SCNC: 8.9 MG/DL (ref 8.6–10.5)
CHLORIDE SERPL-SCNC: 103 MMOL/L (ref 98–107)
CO2 SERPL-SCNC: 24 MMOL/L (ref 22–29)
CREAT SERPL-MCNC: 0.81 MG/DL (ref 0.57–1)
DEPRECATED RDW RBC AUTO: 41.8 FL (ref 37–54)
EGFRCR SERPLBLD CKD-EPI 2021: 95.4 ML/MIN/1.73
EOSINOPHIL # BLD AUTO: 0.11 10*3/MM3 (ref 0–0.4)
EOSINOPHIL NFR BLD AUTO: 1.4 % (ref 0.3–6.2)
ERYTHROCYTE [DISTWIDTH] IN BLOOD BY AUTOMATED COUNT: 13.2 % (ref 12.3–15.4)
GLOBULIN UR ELPH-MCNC: 3 GM/DL
GLUCOSE SERPL-MCNC: 113 MG/DL (ref 65–99)
HCT VFR BLD AUTO: 45.6 % (ref 34–46.6)
HGB BLD-MCNC: 15.7 G/DL (ref 12–15.9)
HOLD SPECIMEN: NORMAL
HOLD SPECIMEN: NORMAL
IMM GRANULOCYTES # BLD AUTO: 0.03 10*3/MM3 (ref 0–0.05)
IMM GRANULOCYTES NFR BLD AUTO: 0.4 % (ref 0–0.5)
LYMPHOCYTES # BLD AUTO: 2.9 10*3/MM3 (ref 0.7–3.1)
LYMPHOCYTES NFR BLD AUTO: 37.2 % (ref 19.6–45.3)
MAGNESIUM SERPL-MCNC: 1.8 MG/DL (ref 1.6–2.6)
MCH RBC QN AUTO: 29.9 PG (ref 26.6–33)
MCHC RBC AUTO-ENTMCNC: 34.4 G/DL (ref 31.5–35.7)
MCV RBC AUTO: 86.9 FL (ref 79–97)
MONOCYTES # BLD AUTO: 0.54 10*3/MM3 (ref 0.1–0.9)
MONOCYTES NFR BLD AUTO: 6.9 % (ref 5–12)
NEUTROPHILS NFR BLD AUTO: 4.18 10*3/MM3 (ref 1.7–7)
NEUTROPHILS NFR BLD AUTO: 53.6 % (ref 42.7–76)
NRBC BLD AUTO-RTO: 0 /100 WBC (ref 0–0.2)
PLATELET # BLD AUTO: 258 10*3/MM3 (ref 140–450)
PMV BLD AUTO: 8.8 FL (ref 6–12)
POTASSIUM SERPL-SCNC: 3.7 MMOL/L (ref 3.5–5.2)
PROT SERPL-MCNC: 7.4 G/DL (ref 6–8.5)
RBC # BLD AUTO: 5.25 10*6/MM3 (ref 3.77–5.28)
SODIUM SERPL-SCNC: 139 MMOL/L (ref 136–145)
TROPONIN T SERPL HS-MCNC: <6 NG/L
WBC NRBC COR # BLD AUTO: 7.8 10*3/MM3 (ref 3.4–10.8)
WHOLE BLOOD HOLD COAG: NORMAL
WHOLE BLOOD HOLD SPECIMEN: NORMAL

## 2024-06-21 PROCEDURE — 83735 ASSAY OF MAGNESIUM: CPT | Performed by: EMERGENCY MEDICINE

## 2024-06-21 PROCEDURE — 85025 COMPLETE CBC W/AUTO DIFF WBC: CPT

## 2024-06-21 PROCEDURE — 71045 X-RAY EXAM CHEST 1 VIEW: CPT

## 2024-06-21 PROCEDURE — 93005 ELECTROCARDIOGRAM TRACING: CPT | Performed by: EMERGENCY MEDICINE

## 2024-06-21 PROCEDURE — 93005 ELECTROCARDIOGRAM TRACING: CPT

## 2024-06-21 PROCEDURE — 84484 ASSAY OF TROPONIN QUANT: CPT | Performed by: EMERGENCY MEDICINE

## 2024-06-21 PROCEDURE — 93010 ELECTROCARDIOGRAM REPORT: CPT | Performed by: INTERNAL MEDICINE

## 2024-06-21 PROCEDURE — 80053 COMPREHEN METABOLIC PANEL: CPT | Performed by: EMERGENCY MEDICINE

## 2024-06-21 PROCEDURE — 99284 EMERGENCY DEPT VISIT MOD MDM: CPT

## 2024-06-21 PROCEDURE — 36415 COLL VENOUS BLD VENIPUNCTURE: CPT

## 2024-06-21 RX ORDER — SODIUM CHLORIDE 0.9 % (FLUSH) 0.9 %
10 SYRINGE (ML) INJECTION AS NEEDED
Status: DISCONTINUED | OUTPATIENT
Start: 2024-06-21 | End: 2024-06-22 | Stop reason: HOSPADM

## 2024-06-22 VITALS
SYSTOLIC BLOOD PRESSURE: 129 MMHG | TEMPERATURE: 98.1 F | HEIGHT: 65 IN | OXYGEN SATURATION: 97 % | RESPIRATION RATE: 18 BRPM | DIASTOLIC BLOOD PRESSURE: 97 MMHG | HEART RATE: 83 BPM | WEIGHT: 241.18 LBS | BODY MASS INDEX: 40.18 KG/M2

## 2024-06-22 LAB
QT INTERVAL: 368 MS
QTC INTERVAL: 432 MS

## 2024-06-22 RX ORDER — PREDNISONE 50 MG/1
50 TABLET ORAL DAILY
Qty: 5 TABLET | Refills: 0 | Status: SHIPPED | OUTPATIENT
Start: 2024-06-22 | End: 2024-06-27

## 2024-06-22 RX ORDER — FLUTICASONE PROPIONATE 50 MCG
2 SPRAY, SUSPENSION (ML) NASAL DAILY
Qty: 15.8 ML | Refills: 0 | Status: SHIPPED | OUTPATIENT
Start: 2024-06-22 | End: 2024-07-06

## 2024-06-22 NOTE — ED PROVIDER NOTES
Time: 12:49 AM EDT  Date of encounter:  6/21/2024  Independent Historian/Clinical History and Information was obtained by:   Patient  Chief Complaint: Dizziness and nausea    History is limited by: N/A    History of Present Illness:  Patient is a 38 y.o. year old female who presents to the emergency department for evaluation of dizziness and nausea.  The patient notes that she is dealt with allergies since she moved back to [place] in October.  The patient notes that she has intermittent nasal congestion, runny nose, ear pressure and vertigo.  He states that she was diagnosed with vertigo last fall and placed on meclizine.  She states she does take it as needed periodically.  Patient notes that over the last couple days she has had increasing congestion, ear pressure and dizziness.  The patient states that the dizziness was worse today.  She states that she had some slight nausea with it.  He states that she only gets dizzy when she moves her head.  It resolves by keeping her head still.  She also has ear pressure.  She denies any ear pain or ringing in the ears.  She states that she does have chronic neck pain that she periodically takes Flexeril for.  She states that she was having some posterior neck pain today.  The fact that her nausea and vertigo was worse made her come into the emergency room.  The patient does note that she is prescribed Flonase at times but she is not taking at this time.  Patient denies any difficulty with coordination or walking.  She has no focal weakness or numbness in her arms or legs.  The patient has no loss of vision or change in vision.  Patient states that she did take her Flexeril as prescribed and her neck pain is now gone.  She denies any headache.  She has had no fever or rash    HPI    Patient Care Team  Primary Care Provider: Lebron Douglas MD    Past Medical History:     Allergies   Allergen Reactions    Buspirone Anaphylaxis     Past Medical History:   Diagnosis  Date    Allergic rhinitis due to allergen 10/02/2020    Anxiety     Depression     Generalized anxiety disorder 08/10/2020    GERD (gastroesophageal reflux disease) 08/10/2020    History of HPV infection 08/10/2020    Night sweat     Sexually transmitted disease     Skin disease      Past Surgical History:   Procedure Laterality Date    TUBAL ABDOMINAL LIGATION  2009     Family History   Problem Relation Age of Onset    Heart disease Maternal Grandmother     Heart disease Maternal Grandfather     Melanoma Maternal Grandfather     Diabetes Maternal Grandfather     Heart disease Paternal Grandfather     Brain cancer Maternal Aunt         Malignant    Breast cancer Maternal Great-Grandmother     Lung cancer Maternal Great-Grandmother     Lung cancer Maternal Great-Grandfather        Home Medications:  Prior to Admission medications    Medication Sig Start Date End Date Taking? Authorizing Provider   famotidine (PEPCID) 20 MG tablet Take 1 tablet by mouth 2 (Two) Times a Day. 6/23/21   Stephany Ledesma APRN   hydrOXYzine (ATARAX) 50 MG tablet Take 1 tablet by mouth Every 6 (Six) Hours As Needed for Anxiety. 9/20/21   Stephany Ledesma APRN        Social History:   Social History     Tobacco Use    Smoking status: Every Day     Current packs/day: 1.00     Average packs/day: 1 pack/day for 20.0 years (20.0 ttl pk-yrs)     Types: Cigarettes    Smokeless tobacco: Never    Tobacco comments:     started at 14   Vaping Use    Vaping status: Every Day    Substances: CBD   Substance Use Topics    Alcohol use: Never         Review of Systems:  Review of Systems   Constitutional:  Negative for chills, diaphoresis and fever.   HENT:  Positive for congestion, ear pain, postnasal drip and rhinorrhea. Negative for sore throat.    Eyes:  Negative for photophobia.   Respiratory:  Negative for cough, chest tightness and shortness of breath.    Cardiovascular:  Negative for chest pain, palpitations and leg swelling.  "  Gastrointestinal:  Positive for nausea. Negative for abdominal pain, diarrhea and vomiting.   Genitourinary:  Negative for difficulty urinating, dysuria, flank pain, frequency, hematuria and urgency.   Musculoskeletal:  Positive for neck pain. Negative for neck stiffness.   Skin:  Negative for pallor and rash.   Neurological:  Positive for dizziness. Negative for syncope, weakness, numbness and headaches.   Hematological:  Negative for adenopathy. Does not bruise/bleed easily.   Psychiatric/Behavioral: Negative.          Physical Exam:  /97 (BP Location: Right arm, Patient Position: Sitting)   Pulse 83   Temp 98.1 °F (36.7 °C) (Oral)   Resp 18   Ht 165.1 cm (65\")   Wt 109 kg (241 lb 2.9 oz)   LMP 06/10/2024   SpO2 97%   BMI 40.13 kg/m²     Physical Exam  Vitals and nursing note reviewed.   Constitutional:       General: She is not in acute distress.     Appearance: Normal appearance. She is not ill-appearing, toxic-appearing or diaphoretic.   HENT:      Head: Normocephalic and atraumatic.      Right Ear: A middle ear effusion is present. Tympanic membrane is not erythematous, retracted or bulging.      Left Ear: A middle ear effusion is present. Tympanic membrane is not erythematous, retracted or bulging.      Nose: Congestion and rhinorrhea present.      Mouth/Throat:      Mouth: Mucous membranes are moist.      Pharynx: No oropharyngeal exudate or posterior oropharyngeal erythema.   Eyes:      Pupils: Pupils are equal, round, and reactive to light.   Neck:      Comments: Patient has some slight tenderness to the paraspinal muscles of the cervical spine on the left  Cardiovascular:      Rate and Rhythm: Normal rate and regular rhythm.      Pulses: Normal pulses.           Carotid pulses are 2+ on the right side and 2+ on the left side.       Radial pulses are 2+ on the right side and 2+ on the left side.        Femoral pulses are 2+ on the right side and 2+ on the left side.       Popliteal pulses " are 2+ on the right side and 2+ on the left side.        Dorsalis pedis pulses are 2+ on the right side and 2+ on the left side.        Posterior tibial pulses are 2+ on the right side and 2+ on the left side.      Heart sounds: Normal heart sounds. No murmur heard.  Pulmonary:      Effort: Pulmonary effort is normal. No accessory muscle usage, respiratory distress or retractions.      Breath sounds: Normal breath sounds. No wheezing, rhonchi or rales.   Abdominal:      General: Abdomen is flat. There is no distension.      Palpations: Abdomen is soft. There is no mass or pulsatile mass.      Tenderness: There is no abdominal tenderness. There is no right CVA tenderness, left CVA tenderness, guarding or rebound.      Comments: No rigidity   Musculoskeletal:         General: No swelling, tenderness or deformity.      Cervical back: Neck supple. No rigidity or tenderness. Muscular tenderness present. No pain with movement or spinous process tenderness. Normal range of motion.      Right lower leg: No edema.      Left lower leg: No edema.   Skin:     General: Skin is warm and dry.      Capillary Refill: Capillary refill takes less than 2 seconds.      Coloration: Skin is not jaundiced or pale.      Findings: No erythema.   Neurological:      General: No focal deficit present.      Mental Status: She is alert and oriented to person, place, and time. Mental status is at baseline.      Cranial Nerves: Cranial nerves 2-12 are intact. No cranial nerve deficit.      Sensory: Sensation is intact. No sensory deficit.      Motor: Motor function is intact. No weakness or pronator drift.      Coordination: Coordination is intact. Coordination normal. Finger-Nose-Finger Test normal.      Gait: Gait normal.   Psychiatric:         Mood and Affect: Mood normal.         Behavior: Behavior normal.                  Procedures:  Procedures      Medical Decision Making:      Comorbidities that affect care:    Allergic rhinitis, anxiety,  depression, GERD, vertigo, allergies    External Notes reviewed:    None      The following orders were placed and all results were independently analyzed by me:  Orders Placed This Encounter   Procedures    XR Chest 1 View    Bertrand Draw    Comprehensive Metabolic Panel    Single High Sensitivity Troponin T    Magnesium    CBC Auto Differential    Vital Signs    ECG 12 Lead ED Triage Standing Order; Weak / Dizzy / AMS    CBC & Differential    Green Top (Gel)    Lavender Top    Gold Top - SST    Light Blue Top       Medications Given in the Emergency Department:  Medications - No data to display       ED Course:    ED Course as of 06/23/24 0300   Fri Jun 21, 2024   4981 EKG:    Rhythm: Sinus rhythm  Rate: 83  Intervals: Normal ND and QT interval  T-wave: Nonspecific T wave flattening  ST Segment: No pathological ST elevation and reciprocal ST depression to suggest STEMI    EKG Comparison: No EKG available for comparison    Interpreted by me   [SD]      ED Course User Index  [SD] David Angelo DO       Labs:    Lab Results (last 24 hours)       ** No results found for the last 24 hours. **             Imaging:    No Radiology Exams Resulted Within Past 24 Hours      Differential Diagnosis and Discussion:    Dizziness: Based on the patient's history, signs, and symptoms, the diffential diagnosis includes but is not limited to meningitis, stroke, sepsis, subarachnoid hemorrhage, intracranial bleeding, encephalitis, vertigo, electrolyte imbalance, and metabolic disorders.    All labs were reviewed and interpreted by me.  EKG was interpreted by me.    MDM  Number of Diagnoses or Management Options  Allergy, initial encounter  Dysfunction of both eustachian tubes  Vertigo  Diagnosis management comments: The patient's CBC was reviewed and shows no abnormalities of critical concern.  Of note, there is no anemia requiring a blood transfusion and the platelet count is acceptable    The patient's CMP was reviewed and shows  no abnormalities of critical concern.  Of note, the patient's sodium and potassium are acceptable.  The patient's liver enzymes are unremarkable.  The patient's renal function including creatinine is preserved.  The patient has a normal anion gap.    The patient's EKG demonstrated a normal sinus rhythm.  The EKG demonstrated no evidence of dysrhythmia.  The patient had no acute ST abnormalities or acute T wave abnormalities to indicate STEMI or other acute cardiac pathology, injury or ischemia    She had normal vital signs while in the emergency room.    Chest x-ray was performed while in the emergency room.  The chest x-ray demonstrated no acute cardiopulmonary process    The patient is resting comfortably and feels better.  The patient is alert talkative and in no distress.  The repeat examination is unremarkable and benign.  The patient is neurologically intact, has a normal mental status and is ambulatory in the emergency department.  The history, exam, diagnostic testing and the patient's current condition does not suggest meningitis, stroke, sepsis, subarachnoid hemorrhage, intracranial bleeding, encephalitis or other significant pathology that would warrant further testing, continued emergency department treatment, admission, neurological consultation or other specialty evaluation at this point.  Vital signs have been stable.  The patient's condition is stable and appropriate for discharge.  The patient will pursue further outpatient evaluation with the primary care physician or other designated or consulting physician as indicated in the discharge instructions.       Amount and/or Complexity of Data Reviewed  Clinical lab tests: reviewed  Tests in the radiology section of CPT®: reviewed  Tests in the medicine section of CPT®: reviewed             Social Determinants of Health:    Patient is independent, reliable, and has access to care.       Disposition and Care Coordination:    Discharged: The patient is  suitable and stable for discharge with no need for consideration of admission.    I have explained discharge medications and the need for follow up with the patient/caretakers. This was also printed in the discharge instructions. Patient was discharged with the following medications and follow up:      Medication List        New Prescriptions      fluticasone 50 MCG/ACT nasal spray  Commonly known as: FLONASE  2 sprays into the nostril(s) as directed by provider Daily for 14 days.     predniSONE 50 MG tablet  Commonly known as: DELTASONE  Take 1 tablet by mouth Daily for 5 days.               Where to Get Your Medications        These medications were sent to [facility] [address] [place] [address], [place] - [address] [facility] - [phone] [facility] [phone] [address], [facility] [address]      Phone: [phone]   fluticasone 50 MCG/ACT nasal spray  predniSONE 50 MG tablet      Lebron Douglas MD  [address] [place] [address]  [phone]    On 6/24/2024         Final diagnoses:   Vertigo   Dysfunction of both eustachian tubes   Allergy, initial encounter        ED Disposition       ED Disposition   Discharge    Condition   Stable    Comment   --               This medical record created using voice recognition software.             David Angelo DO  06/23/24 [id number]

## 2024-06-22 NOTE — ED TRIAGE NOTES
Pt to ED from home with reports of dizziness and vertigo x8-9 months.      Pt dx with vertigo and allergies and was prescribed zyrtec and meclizine.      Pt states she woke up this morning with nausea, decreased appetite, and pain to posterior neck that radiates up into back of head.      Pt states she has hx of anxiety and feels like she is going to have anxiety attack if pain is not controlled.

## 2024-06-22 NOTE — DISCHARGE INSTRUCTIONS
Please return to emergency room for intractable dizziness, headache, vomiting, loss of visual field, change in vision, difficulty speech or swallowing, difficulties coordination or walking, numbness or weakness in your arms or legs or any new symptoms that you are concerned with    Please continue your meclizine as previously prescribed

## 2024-08-05 ENCOUNTER — APPOINTMENT (OUTPATIENT)
Dept: GENERAL RADIOLOGY | Facility: HOSPITAL | Age: 39
End: 2024-08-05
Payer: COMMERCIAL

## 2024-08-05 ENCOUNTER — HOSPITAL ENCOUNTER (EMERGENCY)
Facility: HOSPITAL | Age: 39
Discharge: HOME | End: 2024-08-06
Attending: EMERGENCY MEDICINE
Payer: COMMERCIAL

## 2024-08-05 DIAGNOSIS — E86.0 DEHYDRATION: ICD-10-CM

## 2024-08-05 DIAGNOSIS — N39.0 ACUTE UTI: ICD-10-CM

## 2024-08-05 DIAGNOSIS — T67.5XXA HEAT EXHAUSTION, INITIAL ENCOUNTER: Primary | ICD-10-CM

## 2024-08-05 LAB
ALBUMIN SERPL-MCNC: 4.4 G/DL (ref 3.5–5.2)
ALBUMIN/GLOB SERPL: 1.2 G/DL
ALP SERPL-CCNC: 104 U/L (ref 39–117)
ALT SERPL W P-5'-P-CCNC: 20 U/L (ref 1–33)
ANION GAP SERPL CALCULATED.3IONS-SCNC: 15.6 MMOL/L (ref 5–15)
AST SERPL-CCNC: 27 U/L (ref 1–32)
BACTERIA UR QL AUTO: ABNORMAL /HPF
BASOPHILS # BLD AUTO: 0.04 10*3/MM3 (ref 0–0.2)
BASOPHILS NFR BLD AUTO: 0.4 % (ref 0–1.5)
BILIRUB SERPL-MCNC: 0.9 MG/DL (ref 0–1.2)
BILIRUB UR QL STRIP: NEGATIVE
BUN SERPL-MCNC: 12 MG/DL (ref 6–20)
BUN/CREAT SERPL: 14.6 (ref 7–25)
CALCIUM SPEC-SCNC: 9.2 MG/DL (ref 8.6–10.5)
CHLORIDE SERPL-SCNC: 103 MMOL/L (ref 98–107)
CLARITY UR: ABNORMAL
CO2 SERPL-SCNC: 20.4 MMOL/L (ref 22–29)
COLOR UR: YELLOW
CREAT SERPL-MCNC: 0.82 MG/DL (ref 0.57–1)
DEPRECATED RDW RBC AUTO: 40.2 FL (ref 37–54)
EGFRCR SERPLBLD CKD-EPI 2021: 93.4 ML/MIN/1.73
EOSINOPHIL # BLD AUTO: 0 10*3/MM3 (ref 0–0.4)
EOSINOPHIL NFR BLD AUTO: 0 % (ref 0.3–6.2)
ERYTHROCYTE [DISTWIDTH] IN BLOOD BY AUTOMATED COUNT: 12.8 % (ref 12.3–15.4)
GLOBULIN UR ELPH-MCNC: 3.6 GM/DL
GLUCOSE SERPL-MCNC: 101 MG/DL (ref 65–99)
GLUCOSE UR STRIP-MCNC: NEGATIVE MG/DL
HCT VFR BLD AUTO: 46.2 % (ref 34–46.6)
HGB BLD-MCNC: 16.1 G/DL (ref 12–15.9)
HGB UR QL STRIP.AUTO: NEGATIVE
HOLD SPECIMEN: NORMAL
HOLD SPECIMEN: NORMAL
HYALINE CASTS UR QL AUTO: ABNORMAL /LPF
IMM GRANULOCYTES # BLD AUTO: 0.03 10*3/MM3 (ref 0–0.05)
IMM GRANULOCYTES NFR BLD AUTO: 0.3 % (ref 0–0.5)
KETONES UR QL STRIP: ABNORMAL
LEUKOCYTE ESTERASE UR QL STRIP.AUTO: ABNORMAL
LYMPHOCYTES # BLD AUTO: 1.09 10*3/MM3 (ref 0.7–3.1)
LYMPHOCYTES NFR BLD AUTO: 10.9 % (ref 19.6–45.3)
MAGNESIUM SERPL-MCNC: 1.7 MG/DL (ref 1.6–2.6)
MCH RBC QN AUTO: 30.1 PG (ref 26.6–33)
MCHC RBC AUTO-ENTMCNC: 34.8 G/DL (ref 31.5–35.7)
MCV RBC AUTO: 86.5 FL (ref 79–97)
MONOCYTES # BLD AUTO: 0.34 10*3/MM3 (ref 0.1–0.9)
MONOCYTES NFR BLD AUTO: 3.4 % (ref 5–12)
NEUTROPHILS NFR BLD AUTO: 8.5 10*3/MM3 (ref 1.7–7)
NEUTROPHILS NFR BLD AUTO: 85 % (ref 42.7–76)
NITRITE UR QL STRIP: NEGATIVE
NRBC BLD AUTO-RTO: 0 /100 WBC (ref 0–0.2)
PH UR STRIP.AUTO: 5.5 [PH] (ref 5–8)
PLATELET # BLD AUTO: 243 10*3/MM3 (ref 140–450)
PMV BLD AUTO: 9.1 FL (ref 6–12)
POTASSIUM SERPL-SCNC: 4 MMOL/L (ref 3.5–5.2)
PROT SERPL-MCNC: 8 G/DL (ref 6–8.5)
PROT UR QL STRIP: NEGATIVE
RBC # BLD AUTO: 5.34 10*6/MM3 (ref 3.77–5.28)
RBC # UR STRIP: ABNORMAL /HPF
REF LAB TEST METHOD: ABNORMAL
SODIUM SERPL-SCNC: 139 MMOL/L (ref 136–145)
SP GR UR STRIP: 1.01 (ref 1–1.03)
SQUAMOUS #/AREA URNS HPF: ABNORMAL /HPF
TROPONIN T SERPL HS-MCNC: <6 NG/L
UROBILINOGEN UR QL STRIP: ABNORMAL
WBC # UR STRIP: ABNORMAL /HPF
WBC NRBC COR # BLD AUTO: 10 10*3/MM3 (ref 3.4–10.8)
WHOLE BLOOD HOLD COAG: NORMAL
WHOLE BLOOD HOLD SPECIMEN: NORMAL

## 2024-08-05 PROCEDURE — 93010 ELECTROCARDIOGRAM REPORT: CPT | Performed by: INTERNAL MEDICINE

## 2024-08-05 PROCEDURE — 93005 ELECTROCARDIOGRAM TRACING: CPT | Performed by: EMERGENCY MEDICINE

## 2024-08-05 PROCEDURE — 96375 TX/PRO/DX INJ NEW DRUG ADDON: CPT

## 2024-08-05 PROCEDURE — 99284 EMERGENCY DEPT VISIT MOD MDM: CPT

## 2024-08-05 PROCEDURE — 85025 COMPLETE CBC W/AUTO DIFF WBC: CPT | Performed by: EMERGENCY MEDICINE

## 2024-08-05 PROCEDURE — 71045 X-RAY EXAM CHEST 1 VIEW: CPT

## 2024-08-05 PROCEDURE — 83735 ASSAY OF MAGNESIUM: CPT | Performed by: EMERGENCY MEDICINE

## 2024-08-05 PROCEDURE — 80053 COMPREHEN METABOLIC PANEL: CPT | Performed by: EMERGENCY MEDICINE

## 2024-08-05 PROCEDURE — 84484 ASSAY OF TROPONIN QUANT: CPT | Performed by: EMERGENCY MEDICINE

## 2024-08-05 PROCEDURE — 96365 THER/PROPH/DIAG IV INF INIT: CPT

## 2024-08-05 PROCEDURE — 25010000002 ONDANSETRON PER 1 MG: Performed by: EMERGENCY MEDICINE

## 2024-08-05 PROCEDURE — 25810000003 SODIUM CHLORIDE 0.9 % SOLUTION: Performed by: EMERGENCY MEDICINE

## 2024-08-05 PROCEDURE — 81001 URINALYSIS AUTO W/SCOPE: CPT | Performed by: EMERGENCY MEDICINE

## 2024-08-05 PROCEDURE — 25010000002 GENTAMICIN PER 80 MG: Performed by: EMERGENCY MEDICINE

## 2024-08-05 PROCEDURE — 93005 ELECTROCARDIOGRAM TRACING: CPT

## 2024-08-05 RX ORDER — SODIUM CHLORIDE 0.9 % (FLUSH) 0.9 %
10 SYRINGE (ML) INJECTION AS NEEDED
Status: DISCONTINUED | OUTPATIENT
Start: 2024-08-05 | End: 2024-08-06 | Stop reason: HOSPADM

## 2024-08-05 RX ORDER — ONDANSETRON 2 MG/ML
4 INJECTION INTRAMUSCULAR; INTRAVENOUS ONCE
Status: COMPLETED | OUTPATIENT
Start: 2024-08-05 | End: 2024-08-05

## 2024-08-05 RX ADMIN — GENTAMICIN SULFATE 390 MG: 40 INJECTION, SOLUTION INTRAMUSCULAR; INTRAVENOUS at 23:50

## 2024-08-05 RX ADMIN — SODIUM CHLORIDE 1000 ML: 9 INJECTION, SOLUTION INTRAVENOUS at 21:36

## 2024-08-05 RX ADMIN — ONDANSETRON 4 MG: 2 INJECTION INTRAMUSCULAR; INTRAVENOUS at 21:37

## 2024-08-05 NOTE — ED PROVIDER NOTES
Time: 7:12 PM EDT  Date of encounter:  8/5/2024  Independent Historian/Clinical History and Information was obtained by:   Patient    History is limited by: N/A    Chief Complaint: Dizziness      History of Present Illness:  Patient is a 39 y.o. year old female who presents to the emergency department for evaluation of dizziness.  Patient states she got evicted from her apartment and slept in her car last night.  She kept turning it on and off for AC.  The outside temperatures are greater than 90 degrees with high humidity and he heat index is greater than 100.  Patient reports she has had decreased p.o. intake.  She has had increased fatigue.  Patient complains of numbness from her head to her toes.  She became nauseous and vomited a couple times.  She states that this then sent her into a panic attack.        Patient Care Team  Primary Care Provider: Lebron Douglas MD    Past Medical History:     Allergies   Allergen Reactions    Buspirone Anaphylaxis     Past Medical History:   Diagnosis Date    Allergic rhinitis due to allergen 10/02/2020    Anxiety     Depression     Generalized anxiety disorder 08/10/2020    GERD (gastroesophageal reflux disease) 08/10/2020    History of HPV infection 08/10/2020    Night sweat     Sexually transmitted disease     Skin disease      Past Surgical History:   Procedure Laterality Date    TUBAL ABDOMINAL LIGATION  2009     Family History   Problem Relation Age of Onset    Heart disease Maternal Grandmother     Heart disease Maternal Grandfather     Melanoma Maternal Grandfather     Diabetes Maternal Grandfather     Heart disease Paternal Grandfather     Brain cancer Maternal Aunt         Malignant    Breast cancer Maternal Great-Grandmother     Lung cancer Maternal Great-Grandmother     Lung cancer Maternal Great-Grandfather        Home Medications:  Prior to Admission medications    Medication Sig Start Date End Date Taking? Authorizing Provider   famotidine (PEPCID) 20 MG  "tablet Take 1 tablet by mouth 2 (Two) Times a Day. 6/23/21   Stephany Ledesma APRN   fluticasone (FLONASE) 50 MCG/ACT nasal spray 2 sprays into the nostril(s) as directed by provider Daily for 14 days. 6/22/24 7/6/24  David Angelo DO   hydrOXYzine (ATARAX) 50 MG tablet Take 1 tablet by mouth Every 6 (Six) Hours As Needed for Anxiety. 9/20/21   Stephany Ledesma APRN        Social History:   Social History     Tobacco Use    Smoking status: Every Day     Current packs/day: 1.00     Average packs/day: 1 pack/day for 20.0 years (20.0 ttl pk-yrs)     Types: Cigarettes    Smokeless tobacco: Never    Tobacco comments:     started at 14   Vaping Use    Vaping status: Every Day    Substances: CBD   Substance Use Topics    Alcohol use: Never         Review of Systems:  Review of Systems   Constitutional:  Negative for chills and fever.   HENT:  Negative for congestion, ear pain and sore throat.    Eyes:  Negative for pain.   Respiratory:  Negative for cough, chest tightness and shortness of breath.    Cardiovascular:  Negative for chest pain.   Gastrointestinal:  Positive for nausea and vomiting. Negative for abdominal pain and diarrhea.   Genitourinary:  Negative for flank pain and hematuria.   Musculoskeletal:  Negative for joint swelling.   Skin:  Negative for pallor.   Neurological:  Positive for dizziness and numbness. Negative for seizures and headaches.   All other systems reviewed and are negative.       Physical Exam:  /81 (BP Location: Left arm, Patient Position: Lying)   Pulse 91   Temp 98.8 °F (37.1 °C) (Oral)   Resp 18   Ht 165.1 cm (65\")   Wt 111 kg (244 lb 4.3 oz)   SpO2 98%   BMI 40.65 kg/m²   Vital signs were reviewed under triage note.  General appearance - Patient appears well-developed and well-nourished.  Patient is in no acute distress.  Head - Normocephalic, atraumatic.  Pupils - Equal, round, reactive to light.  Extraocular muscles are intact.  Conjunctiva is clear.  Nasal - " Normal inspection.  No evidence of trauma or epistaxis.  Tympanic membranes - Gray, intact without erythema or retractions.  Oral mucosa - Pink and moist without lesions or erythema.  Uvula is midline.  Chest wall - Atraumatic.  Chest wall is nontender.  There are no vesicular rashes noted.  Neck - Supple.  Trachea was midline.  There is no palpable lymphadenopathy or thyromegaly.  There are no meningeal signs  Lungs - Clear to auscultation and percussion bilaterally.  Heart - Regular rate and rhythm without any murmurs, clicks, or gallops.  Abdomen - Soft.  Bowel sounds are present.  There is no palpable tenderness.  There is no rebound, guarding, or rigidity.  There are no palpable masses.  There are no pulsatile masses.  Back - Spine is straight and midline.  There is no CVA tenderness.  Extremities - Intact x4 with full range of motion.  There is no palpable edema.  Pulses are intact x4 and equal.  Neurologic - Patient is awake, alert, and oriented x3.  Cranial nerves II through XII are grossly intact.  Motor and sensory functions grossly intact.  Cerebellar function was normal.  Integument - There are no rashes.  There are no petechia or purpura lesions noted.  There are no vesicular lesions noted.            Procedures:  Procedures      Medical Decision Making:      Comorbidities that affect care:    Anxiety, depression, GERD, homelessness    External Notes reviewed:    Telephone Encounter: Telemedicine visit with Stephany Martinezdajuan on 6/23/2021 was reviewed by me.      The following orders were placed and all results were independently analyzed by me:  Orders Placed This Encounter   Procedures    XR Chest 1 View    [place] Draw    Comprehensive Metabolic Panel    Single High Sensitivity Troponin T    Magnesium    Urinalysis With Microscopic If Indicated (No Culture) - Urine, Clean Catch    CBC Auto Differential    Urinalysis, Microscopic Only - Urine, Clean Catch    NPO Diet NPO Type: Strict NPO    Undress &  Gown    Continuous Pulse Oximetry    Vital Signs    Orthostatic Blood Pressure    Oxygen Therapy- Nasal Cannula; Titrate 1-6 LPM Per SpO2; 90 - 95%    POC Glucose Once    ECG 12 Lead ED Triage Standing Order; Weak / Dizzy / AMS    Insert Peripheral IV    Fall Precautions    CBC & Differential    Green Top (Gel)    Lavender Top    Gold Top - SST    Light Blue Top       Medications Given in the Emergency Department:  Medications   sodium chloride 0.9 % flush 10 mL (has no administration in time range)   sodium chloride 0.9 % bolus 1,000 mL (has no administration in time range)   gentamicin (GARAMYCIN) 390 mg in sodium chloride 0.9 % 100 mL IVPB (has no administration in time range)   sodium chloride 0.9 % bolus 1,000 mL (1,000 mL Intravenous New Bag 8/5/24 2136)   ondansetron (ZOFRAN) injection 4 mg (4 mg Intravenous Given 8/5/24 2137)        ED Course:    ED Course as of 08/11/24 1202   Mon Aug 05, 2024   1913 --- PROVIDER IN TRIAGE NOTE ---    The patient was evaluated by Joanna reed in triage. Orders were placed and the patient is currently awaiting disposition.    [AJ]   Michelle Aug 11, 2024   1159 EKG performed at 1919 was interpreted by me showing normal sinus rhythm with a ventricular rate of 73 bpm.  The AR interval is 144 ms.  P waves are normal.  QRS interval is normal.  Axis was at 59 degrees.  There was no acute ischemic ST or T wave change identified.  QT corrected was 440 ms. [TB]      ED Course User Index  [AJ] Joanna Pugh PA-C  [TB] Jacobo Landers DO     The patient was seen and evaluated the ED by me.  The above history and physical examination was performed as documented.  Diagnostic data was obtained.  Results reviewed.  Findings were discussed with the patient.  Patient has an apparent UTI.  Patient was treated with IV gentamicin.  Patient was given 2 L of IV crystalloid solution.  Patient's workup was otherwise stable.  Patient is stable for discharge home.    Labs:    Lab Results (last  24 hours)       Procedure Component Value Units Date/Time    CBC & Differential [[id number]]  (Abnormal) Collected: 08/05/24 2103    Specimen: Blood from Arm, Left Updated: 08/05/24 2112    Narrative:      The following orders were created for panel order CBC & Differential.  Procedure                               Abnormality         Status                     ---------                               -----------         ------                     CBC Auto Differential[[id number]]        Abnormal            Final result                 Please view results for these tests on the individual orders.    Comprehensive Metabolic Panel [[id number]]  (Abnormal) Collected: 08/05/24 2103    Specimen: Blood from Arm, Left Updated: 08/05/24 2139     Glucose 101 mg/dL      BUN 12 mg/dL      Creatinine 0.82 mg/dL      Sodium 139 mmol/L      Potassium 4.0 mmol/L      Comment: Slight hemolysis detected by analyzer. Result may be falsely elevated.        Chloride 103 mmol/L      CO2 20.4 mmol/L      Calcium 9.2 mg/dL      Total Protein 8.0 g/dL      Albumin 4.4 g/dL      ALT (SGPT) 20 U/L      AST (SGOT) 27 U/L      Comment: Slight hemolysis detected by analyzer. Result may be falsely elevated.        Alkaline Phosphatase 104 U/L      Total Bilirubin 0.9 mg/dL      Globulin 3.6 gm/dL      A/G Ratio 1.2 g/dL      BUN/Creatinine Ratio 14.6     Anion Gap 15.6 mmol/L      eGFR 93.4 mL/min/1.73     Narrative:      GFR Normal >60  Chronic Kidney Disease <60  Kidney Failure <15      Single High Sensitivity Troponin T [[id number]]  (Normal) Collected: 08/05/24 2103    Specimen: Blood from Arm, Left Updated: 08/05/24 2137     HS Troponin T <6 ng/L     Narrative:      High Sensitive Troponin T Reference Range:  <14.0 ng/L- Negative Female for AMI  <22.0 ng/L- Negative Male for AMI  >=14 - Abnormal Female indicating possible myocardial injury.  >=22 - Abnormal Male indicating possible myocardial injury.   Clinicians would have to utilize clinical  acumen, EKG, Troponin, and serial changes to determine if it is an Acute Myocardial Infarction or myocardial injury due to an underlying chronic condition.         Magnesium [[id number]]  (Normal) Collected: 08/05/24 2103    Specimen: Blood from Arm, Left Updated: 08/05/24 2139     Magnesium 1.7 mg/dL     Urinalysis With Microscopic If Indicated (No Culture) - Urine, Clean Catch [[id number]]  (Abnormal) Collected: 08/05/24 2103    Specimen: Urine, Clean Catch Updated: 08/05/24 2120     Color, UA Yellow     Appearance, UA Cloudy     pH, UA 5.5     Specific Gravity, UA 1.014     Glucose, UA Negative     Ketones, UA 80 mg/dL (3+)     Bilirubin, UA Negative     Blood, UA Negative     Protein, UA Negative     Leuk Esterase, UA Trace     Nitrite, UA Negative     Urobilinogen, UA 0.2 E.U./dL    CBC Auto Differential [[id number]]  (Abnormal) Collected: 08/05/24 2103    Specimen: Blood from Arm, Left Updated: 08/05/24 2112     WBC 10.00 10*3/mm3      RBC 5.34 10*6/mm3      Hemoglobin 16.1 g/dL      Hematocrit 46.2 %      MCV 86.5 fL      MCH 30.1 pg      MCHC 34.8 g/dL      RDW 12.8 %      RDW-SD 40.2 fl      MPV 9.1 fL      Platelets 243 10*3/mm3      Neutrophil % 85.0 %      Lymphocyte % 10.9 %      Monocyte % 3.4 %      Eosinophil % 0.0 %      Basophil % 0.4 %      Immature Grans % 0.3 %      Neutrophils, Absolute 8.50 10*3/mm3      Lymphocytes, Absolute 1.09 10*3/mm3      Monocytes, Absolute 0.34 10*3/mm3      Eosinophils, Absolute 0.00 10*3/mm3      Basophils, Absolute 0.04 10*3/mm3      Immature Grans, Absolute 0.03 10*3/mm3      nRBC 0.0 /100 WBC     Urinalysis, Microscopic Only - Urine, Clean Catch [[id number]]  (Abnormal) Collected: 08/05/24 2103    Specimen: Urine, Clean Catch Updated: 08/05/24 2120     RBC, UA 0-2 /HPF      WBC, UA 11-20 /HPF      Bacteria, UA 2+ /HPF      Squamous Epithelial Cells, UA 7-12 /HPF      Hyaline Casts, UA 3-6 /LPF      Methodology Automated Microscopy             Imaging:    XR Chest 1  View    Result Date: 8/5/2024  XR CHEST 1 VW Date of Exam: 8/5/2024 7:35 PM EDT Indication: Weak/Dizzy/AMS triage protocol Comparison: 6/21/2024 Findings: Heart, mediastinum, and pulmonary vasculature appear within normal limits. Lungs appear normally expanded and clear. No edema effusion or pneumothorax is seen.     Impression: No evidence of active chest disease. Electronically Signed: Donnell Sheppard MD  8/5/2024 8:14 PM EDT  Workstation ID: [id number]       Differential Diagnosis and Discussion:    Metabolic: Differential diagnosis includes but is not limited to hypertension, hyperglycemia, hyperkalemia, hypocalcemia, metabolic acidosis, hypokalemia, hypoglycemia, malnutrition, hypothyroidism, hyperthyroidism, and adrenal insufficiency.     All labs were reviewed and interpreted by me.  All X-rays impressions were independently interpreted by me.  EKG was interpreted by me.    MDM     Amount and/or Complexity of Data Reviewed  Clinical lab tests: reviewed  Tests in the radiology section of CPT®: reviewed  Tests in the medicine section of CPT®: reviewed                 Patient Care Considerations:    SEPSIS was considered but is NOT present in the emergency department as SIRS criteria is not present.      Consultants/Shared Management Plan:    None    Social Determinants of Health:    Patient is independent, reliable, and has access to care.       Disposition and Care Coordination:    Discharged: The patient is suitable and stable for discharge with no need for consideration of admission.    I have explained the patient´s condition, diagnoses and treatment plan based on the information available to me at this time. I have answered questions and addressed any concerns. The patient has a good  understanding of the patient´s diagnosis, condition, and treatment plan as can be expected at this point. The vital signs have been stable. The patient´s condition is stable and appropriate for discharge from the emergency  department.      The patient will pursue further outpatient evaluation with the primary care physician or other designated or consulting physician as outlined in the discharge instructions. They are agreeable to this plan of care and follow-up instructions have been explained in detail. The patient has received these instructions in written format and has expressed an understanding of the discharge instructions. The patient is aware that any significant change in condition or worsening of symptoms should prompt an immediate return to this or the closest emergency department or call to 911.    Final diagnoses:   Heat exhaustion, initial encounter   Dehydration   Acute UTI        ED Disposition       ED Disposition   Discharge    Condition   Stable    Comment   --               This medical record created using voice recognition software.             Jacobo Landers DO  08/11/24 [id number]

## 2024-08-05 NOTE — ED TRIAGE NOTES
"Pt to ED stating she was evicted from home yesterday and was trying to sleep in vehicle.  Pt states today she got \"really bad sick\", developed vomiting, dizziness, muffled hearing, pins and needles in \"from my feet all the way to my chest\".  "

## 2024-08-06 VITALS
TEMPERATURE: 98 F | RESPIRATION RATE: 20 BRPM | OXYGEN SATURATION: 100 % | DIASTOLIC BLOOD PRESSURE: 73 MMHG | HEIGHT: 65 IN | HEART RATE: 68 BPM | BODY MASS INDEX: 40.7 KG/M2 | SYSTOLIC BLOOD PRESSURE: 110 MMHG | WEIGHT: 244.27 LBS

## 2024-08-06 LAB
QT INTERVAL: 399 MS
QTC INTERVAL: 440 MS

## 2024-08-06 PROCEDURE — 25810000003 SODIUM CHLORIDE 0.9 % SOLUTION: Performed by: EMERGENCY MEDICINE

## 2024-08-06 RX ORDER — IBUPROFEN 400 MG/1
800 TABLET ORAL ONCE
Status: COMPLETED | OUTPATIENT
Start: 2024-08-06 | End: 2024-08-06

## 2024-08-06 RX ADMIN — SODIUM CHLORIDE 1000 ML: 9 INJECTION, SOLUTION INTRAVENOUS at 00:40

## 2024-08-06 RX ADMIN — IBUPROFEN 800 MG: 400 TABLET, FILM COATED ORAL at 00:40

## 2024-08-06 NOTE — DISCHARGE INSTRUCTIONS
Rest.  Drink plenty of fluids.  Ensure adequate hydration.  Follow-up primary care provider as needed.  Return to the ER for any other concerns issues that may arise.